# Patient Record
Sex: MALE
[De-identification: names, ages, dates, MRNs, and addresses within clinical notes are randomized per-mention and may not be internally consistent; named-entity substitution may affect disease eponyms.]

---

## 2021-07-20 PROBLEM — Z00.00 ENCOUNTER FOR PREVENTIVE HEALTH EXAMINATION: Status: ACTIVE | Noted: 2021-07-20

## 2021-08-03 ENCOUNTER — APPOINTMENT (OUTPATIENT)
Dept: PEDIATRIC MEDICAL GENETICS | Facility: CLINIC | Age: 36
End: 2021-08-03

## 2021-08-13 ENCOUNTER — NON-APPOINTMENT (OUTPATIENT)
Age: 36
End: 2021-08-13

## 2021-08-13 ENCOUNTER — APPOINTMENT (OUTPATIENT)
Dept: CARDIOLOGY | Facility: CLINIC | Age: 36
End: 2021-08-13
Payer: COMMERCIAL

## 2021-08-13 VITALS
SYSTOLIC BLOOD PRESSURE: 112 MMHG | OXYGEN SATURATION: 100 % | WEIGHT: 168 LBS | TEMPERATURE: 98.6 F | HEART RATE: 95 BPM | BODY MASS INDEX: 24.05 KG/M2 | HEIGHT: 70 IN | DIASTOLIC BLOOD PRESSURE: 70 MMHG

## 2021-08-13 DIAGNOSIS — R01.1 CARDIAC MURMUR, UNSPECIFIED: ICD-10-CM

## 2021-08-13 DIAGNOSIS — R55 SYNCOPE AND COLLAPSE: ICD-10-CM

## 2021-08-13 DIAGNOSIS — R07.89 OTHER CHEST PAIN: ICD-10-CM

## 2021-08-13 DIAGNOSIS — Z78.9 OTHER SPECIFIED HEALTH STATUS: ICD-10-CM

## 2021-08-13 PROCEDURE — 99204 OFFICE O/P NEW MOD 45 MIN: CPT

## 2021-08-13 PROCEDURE — 93000 ELECTROCARDIOGRAM COMPLETE: CPT

## 2021-08-13 NOTE — HISTORY OF PRESENT ILLNESS
[FreeTextEntry1] : PCP: Madisyn Jones NP\par \par 36M no pmhx who presents for evaluation of syncope.\par \par COVID vaccinated 2021, Moderna\par \par syncope the day prior to presentation, hot and humid, pt probably dehydrated\par notes some mild chest pressure for 1-2 days prior. at the time of his syncope he felt a prodrome - then LOC for a few seconds, then repeat episode as soon as he started eating. No incontinence, tongue biting, or seizure-like activity. no significant trauma. since then hasn’t quite felt right\par \par No prior hx of syncope.\par no tobacco, occ etoh, no other supplements or substances/sympathomimetics\par \par Fam hx:\par Mom CABG age 42\par Maternal grandfather  from presumed CAD age 60s\par \par

## 2021-08-13 NOTE — DISCUSSION/SUMMARY
[FreeTextEntry1] : Suspect pt's syncope related to hypovolemia and vasodilation due to heat and concomitant etoh. will rule out structural heart disease as a contributor.\par His ECG is normal.\par \par However, he does have a family history of premature CAD as his mother had a CABG at the age of 42 and the pt has the presence of two systolic murmurs. No recent cardiac evaluation. chest pressure which is vague in nature.\par \par -check TTE to evaluate for valvular heart disease\par -check a treadmill stress test to evaluate for exertional symptoms, cardiac ischemia, and exercise-induced arrhythmia\par -reviewed the avoidance of potential triggers and hydration techniques. counterpressure maneuvers reviewed. should syncope recur will discuss the role for cardiac rhythm monitoring.

## 2021-08-13 NOTE — PHYSICAL EXAM
[Normal S1, S2] : normal S1, S2 [No Rub] : no rub [No Gallop] : no gallop [Murmur] : murmur [Soft] : abdomen soft [Non Tender] : non-tender [Normal] : alert and oriented, normal memory [de-identified] : 1/6 early systolic RUSB, 2/6 systolic LLSB

## 2021-08-13 NOTE — REVIEW OF SYSTEMS
[Chills] : chills [Feeling Fatigued] : feeling fatigued [Sore Throat] : sore throat [Chest Discomfort] : chest discomfort [Syncope] : syncope [Dizziness] : dizziness [Negative] : Heme/Lymph [Fever] : no fever [SOB] : no shortness of breath [Dyspnea on exertion] : not dyspnea during exertion [Lower Ext Edema] : no extremity edema [Leg Claudication] : no intermittent leg claudication [Palpitations] : no palpitations [Orthopnea] : no orthopnea [PND] : no PND

## 2021-08-19 ENCOUNTER — APPOINTMENT (OUTPATIENT)
Dept: CARDIOLOGY | Facility: CLINIC | Age: 36
End: 2021-08-19
Payer: COMMERCIAL

## 2021-08-19 VITALS — SYSTOLIC BLOOD PRESSURE: 126 MMHG | DIASTOLIC BLOOD PRESSURE: 84 MMHG | HEART RATE: 81 BPM

## 2021-08-19 PROCEDURE — 93306 TTE W/DOPPLER COMPLETE: CPT

## 2021-08-19 PROCEDURE — 93015 CV STRESS TEST SUPVJ I&R: CPT

## 2021-08-19 PROCEDURE — 99213 OFFICE O/P EST LOW 20 MIN: CPT | Mod: 25

## 2023-04-12 ENCOUNTER — NON-APPOINTMENT (OUTPATIENT)
Age: 38
End: 2023-04-12

## 2023-04-13 ENCOUNTER — APPOINTMENT (OUTPATIENT)
Dept: CARDIOLOGY | Facility: CLINIC | Age: 38
End: 2023-04-13
Payer: COMMERCIAL

## 2023-04-13 ENCOUNTER — NON-APPOINTMENT (OUTPATIENT)
Age: 38
End: 2023-04-13

## 2023-04-13 VITALS
HEART RATE: 91 BPM | DIASTOLIC BLOOD PRESSURE: 75 MMHG | OXYGEN SATURATION: 97 % | SYSTOLIC BLOOD PRESSURE: 113 MMHG | HEIGHT: 70 IN | TEMPERATURE: 98.2 F

## 2023-04-13 DIAGNOSIS — R00.2 PALPITATIONS: ICD-10-CM

## 2023-04-13 PROCEDURE — 93000 ELECTROCARDIOGRAM COMPLETE: CPT

## 2023-04-13 PROCEDURE — 99214 OFFICE O/P EST MOD 30 MIN: CPT | Mod: 25

## 2023-04-13 PROCEDURE — 36415 COLL VENOUS BLD VENIPUNCTURE: CPT

## 2023-04-13 NOTE — CARDIOLOGY SUMMARY
[de-identified] : 8/13/2021: [facility] [de-identified] : 8/19/21:\par Jitendra protocol 8min\par no ischemia [de-identified] : 8/19/2021:\par nl

## 2023-04-13 NOTE — REVIEW OF SYSTEMS
[Chills] : chills [Feeling Fatigued] : feeling fatigued [Sore Throat] : sore throat [Chest Discomfort] : chest discomfort [Negative] : Heme/Lymph [Fever] : no fever [Headache] : headache [SOB] : no shortness of breath [Dyspnea on exertion] : not dyspnea during exertion [Lower Ext Edema] : no extremity edema [Leg Claudication] : no intermittent leg claudication [Palpitations] : no palpitations [Orthopnea] : no orthopnea [PND] : no PND [Syncope] : no syncope [Cough] : cough [Dizziness] : no dizziness

## 2023-04-13 NOTE — CARDIOLOGY SUMMARY
[de-identified] : 8/13/2021: [facility] [de-identified] : 8/19/21:\par Jitendra protocol 8min\par no ischemia [de-identified] : 8/19/2021:\par nl

## 2023-04-13 NOTE — PHYSICAL EXAM
[Normal S1, S2] : normal S1, S2 [No Rub] : no rub [No Gallop] : no gallop [Murmur] : murmur [Soft] : abdomen soft [Non Tender] : non-tender [Normal] : alert and oriented, normal memory [de-identified] : 1/6 early systolic RUSB

## 2023-04-13 NOTE — HISTORY OF PRESENT ILLNESS
[FreeTextEntry1] : PCP: Madisyn Jones NP\par \par 37M fam hx of premature CAD who presents with chest pain\par \par COVID vaccinated 2021, Moderna\par \par chest pain for the past few days\par with URI symptoms\par worse with hitting a pothole, moving arms, deep breath\par not particularly exertional\par no orthopnea\par \par No prior hx of syncope.\par no tobacco, occ etoh, no other supplements or substances/sympathomimetics\par \par Fam hx:\par Mom CABG age 42\par Maternal grandfather  from presumed CAD age 60s\par \par 2022: Nonfasting\par Chol 241//\par \par \par

## 2023-04-13 NOTE — PHYSICAL EXAM
[Normal S1, S2] : normal S1, S2 [No Rub] : no rub [No Gallop] : no gallop [Murmur] : murmur [Soft] : abdomen soft [Non Tender] : non-tender [Normal] : alert and oriented, normal memory [de-identified] : 1/6 early systolic RUSB

## 2023-04-13 NOTE — DISCUSSION/SUMMARY
[EKG obtained to assist in diagnosis and management of assessed problem(s)] : EKG obtained to assist in diagnosis and management of assessed problem(s) [FreeTextEntry1] : Possible costochondritis, however pt with strong fam hx of premature CAD\par will check bloodwork\par check TTE and treadmill to evaluate for structural heart disease and rule out pericardial effusion given pleuritic component

## 2023-04-14 ENCOUNTER — MESSAGE (OUTPATIENT)
Age: 38
End: 2023-04-14

## 2023-04-14 LAB
ALBUMIN SERPL ELPH-MCNC: 4.9 G/DL
ALP BLD-CCNC: 73 U/L
ALT SERPL-CCNC: 16 U/L
ANION GAP SERPL CALC-SCNC: 13 MMOL/L
AST SERPL-CCNC: 19 U/L
BASOPHILS # BLD AUTO: 0.03 K/UL
BASOPHILS NFR BLD AUTO: 0.5 %
BILIRUB SERPL-MCNC: 1.3 MG/DL
BUN SERPL-MCNC: 12 MG/DL
CALCIUM SERPL-MCNC: 9.5 MG/DL
CHLORIDE SERPL-SCNC: 104 MMOL/L
CHOLEST SERPL-MCNC: 191 MG/DL
CO2 SERPL-SCNC: 23 MMOL/L
CREAT SERPL-MCNC: 0.92 MG/DL
CRP SERPL-MCNC: 3 MG/L
EGFR: 110 ML/MIN/1.73M2
EOSINOPHIL # BLD AUTO: 0.14 K/UL
EOSINOPHIL NFR BLD AUTO: 2.1 %
GLUCOSE SERPL-MCNC: 110 MG/DL
HCT VFR BLD CALC: 41.6 %
HDLC SERPL-MCNC: 41 MG/DL
HGB BLD-MCNC: 14 G/DL
IMM GRANULOCYTES NFR BLD AUTO: 0.2 %
LDLC SERPL CALC-MCNC: 104 MG/DL
LYMPHOCYTES # BLD AUTO: 1.48 K/UL
LYMPHOCYTES NFR BLD AUTO: 22.2 %
MAN DIFF?: NORMAL
MCHC RBC-ENTMCNC: 30.6 PG
MCHC RBC-ENTMCNC: 33.7 GM/DL
MCV RBC AUTO: 91 FL
MONOCYTES # BLD AUTO: 0.87 K/UL
MONOCYTES NFR BLD AUTO: 13.1 %
NEUTROPHILS # BLD AUTO: 4.13 K/UL
NEUTROPHILS NFR BLD AUTO: 61.9 %
NONHDLC SERPL-MCNC: 151 MG/DL
PLATELET # BLD AUTO: 234 K/UL
POTASSIUM SERPL-SCNC: 4 MMOL/L
PROT SERPL-MCNC: 7 G/DL
RBC # BLD: 4.57 M/UL
RBC # FLD: 11.6 %
SODIUM SERPL-SCNC: 139 MMOL/L
TRIGL SERPL-MCNC: 232 MG/DL
TROPONIN I SERPL-MCNC: <0.01 NG/ML
WBC # FLD AUTO: 6.66 K/UL

## 2023-05-02 ENCOUNTER — APPOINTMENT (OUTPATIENT)
Dept: CARDIOLOGY | Facility: CLINIC | Age: 38
End: 2023-05-02

## 2023-05-22 ENCOUNTER — APPOINTMENT (OUTPATIENT)
Dept: CARDIOLOGY | Facility: CLINIC | Age: 38
End: 2023-05-22